# Patient Record
Sex: FEMALE | URBAN - METROPOLITAN AREA
[De-identification: names, ages, dates, MRNs, and addresses within clinical notes are randomized per-mention and may not be internally consistent; named-entity substitution may affect disease eponyms.]

---

## 2023-11-16 ENCOUNTER — OFFICE VISIT (OUTPATIENT)
Dept: URBAN - METROPOLITAN AREA CLINIC 63 | Facility: CLINIC | Age: 43
End: 2023-11-16
Payer: COMMERCIAL

## 2023-11-16 ENCOUNTER — LAB OUTSIDE AN ENCOUNTER (OUTPATIENT)
Dept: URBAN - METROPOLITAN AREA CLINIC 63 | Facility: CLINIC | Age: 43
End: 2023-11-16

## 2023-11-16 VITALS
DIASTOLIC BLOOD PRESSURE: 80 MMHG | HEIGHT: 62 IN | TEMPERATURE: 97.1 F | WEIGHT: 190 LBS | HEART RATE: 87 BPM | BODY MASS INDEX: 34.96 KG/M2 | OXYGEN SATURATION: 98 % | SYSTOLIC BLOOD PRESSURE: 128 MMHG

## 2023-11-16 DIAGNOSIS — R19.7 DIARRHEA, UNSPECIFIED TYPE: ICD-10-CM

## 2023-11-16 DIAGNOSIS — K21.9 GASTROESOPHAGEAL REFLUX DISEASE, UNSPECIFIED WHETHER ESOPHAGITIS PRESENT: ICD-10-CM

## 2023-11-16 DIAGNOSIS — R74.8 ABNORMAL LIVER ENZYMES: ICD-10-CM

## 2023-11-16 PROBLEM — 235595009: Status: ACTIVE | Noted: 2023-11-16

## 2023-11-16 PROCEDURE — 99204 OFFICE O/P NEW MOD 45 MIN: CPT | Performed by: INTERNAL MEDICINE

## 2023-11-16 RX ORDER — TRAMADOL HYDROCHLORIDE 50 MG/1
TAKE ONE TABLET BY MOUTH TWICE A DAY AS NEEDED FOR PAIN TABLET, COATED ORAL
Qty: 60 UNSPECIFIED | Refills: 0 | Status: ACTIVE | COMMUNITY

## 2023-11-16 RX ORDER — BUPROPION HYDROCHLORIDE 300 MG/1
TAKE ONE TABLET BY MOUTH EVERY MORNING TABLET, EXTENDED RELEASE ORAL
Qty: 30 UNSPECIFIED | Refills: 1 | Status: ACTIVE | COMMUNITY

## 2023-11-16 RX ORDER — OMEPRAZOLE 20 MG/1
1 CAPSULE 30 MINUTES BEFORE MORNING MEAL CAPSULE, DELAYED RELEASE ORAL ONCE A DAY
Status: ACTIVE | COMMUNITY

## 2023-11-16 RX ORDER — TACROLIMUS 0.75 MG/1
TAKE ONE BY MOUTH ONE TIME DAILY TABLET, EXTENDED RELEASE ORAL
Qty: 90 UNSPECIFIED | Refills: 0 | Status: ACTIVE | COMMUNITY

## 2023-11-16 RX ORDER — METHOCARBAMOL TABLETS 750 MG/1
TAKE ONE TABLET BY MOUTH TWICE A DAY AS NEEDED TABLET, COATED ORAL
Qty: 180 UNSPECIFIED | Refills: 0 | Status: ACTIVE | COMMUNITY

## 2023-11-16 RX ORDER — TRAZODONE HYDROCHLORIDE 100 MG/1
TAKE ONE TABLET BY MOUTH AT BEDTIME TABLET ORAL
Qty: 30 UNSPECIFIED | Refills: 1 | Status: ACTIVE | COMMUNITY

## 2023-11-16 RX ORDER — ALPRAZOLAM 0.25 MG/1
TAKE ONE TABLET BY MOUTH ONE TIME DAILY AS NEEDED TABLET ORAL
Qty: 30 UNSPECIFIED | Refills: 2 | Status: ACTIVE | COMMUNITY

## 2023-11-16 NOTE — HPI-TODAY'S VISIT:
Jessica is a 43-year-old female that presents today as a new patient.  Referred for elevated liver enzymes.  Past medical history significant for renal disease, Alport syndrome, hyperlipidemia, hypertension.  Past surgical history significant for kidney transplant, hysterectomy, cholecystectomy, gastric sleeve. Prior labs reviewed and are summarized below.  Her main complaint in the office today is diarrhea and epigastric pain.  She reports having between 2 and 4 loose stools daily.  She has had epigastric discomfort over the past 3 months.  He tried omeprazole with minimal improvement.  Denies nausea or vomiting.  Denies unintentional weight loss.  Denies dysphagia.  With regards to her liver enzymes, she denies prior history of liver disease.  Denies alcohol use.  New medications or supplements. Labs 09/2023:Bilirubin 0.5, alkaline phosphatase 241, AST 62, , creatinine 1.01 BUN 8, albumin 4.5. Labs 01/2023:Bilirubin 0.4, alkaline phosphatase 115, AST 20, ALT 23 creatinine 1.19, BUN 8.

## 2023-12-10 ENCOUNTER — TELEPHONE ENCOUNTER (OUTPATIENT)
Dept: URBAN - METROPOLITAN AREA CLINIC 63 | Facility: CLINIC | Age: 43
End: 2023-12-10

## 2023-12-11 ENCOUNTER — TELEPHONE ENCOUNTER (OUTPATIENT)
Dept: URBAN - METROPOLITAN AREA CLINIC 63 | Facility: CLINIC | Age: 43
End: 2023-12-11

## 2023-12-11 LAB
% SATURATION: 32
A/G RATIO: 1.9
ACTIN (SMOOTH MUSCLE) ANTIBODY (IGG): <20
ALBUMIN: 4.4
ALKALINE PHOSPHATASE: 107
ALT (SGPT): 15
ANA SCREEN, IFA: NEGATIVE
AST (SGOT): 17
BILIRUBIN, TOTAL: 0.5
BUN/CREATININE RATIO: 6
BUN: 7
CALCIUM: 9.6
CARBON DIOXIDE, TOTAL: 27
CERULOPLASMIN: 32
CHLORIDE: 103
CREATININE: 1.2
EGFR: 58
FERRITIN: 517
GLOBULIN, TOTAL: 2.3
GLUCOSE: 81
HEP B CORE AB, IGM: (no result)
HEPATITIS A AB, TOTAL: (no result)
HEPATITIS B CORE AB TOTAL: (no result)
HEPATITIS B SURFACE ANTIGEN: (no result)
HEPATITIS C ANTIBODY: (no result)
IMMUNOGLOBULIN G, QN, SERUM: 945
IRON BINDING CAPACITY: 286
IRON, TOTAL: 92
MITOCHONDRIAL (M2) ANTIBODY: <=20
POTASSIUM: 4.5
PROTEIN, TOTAL: 6.7
SODIUM: 140

## 2023-12-19 LAB
A-1 ANTITRYPSIN MUTATION: (no result)
Lab: (no result)
REFERRING PHYSICIAN: (no result)

## 2024-01-30 ENCOUNTER — OFFICE VISIT (OUTPATIENT)
Dept: URBAN - METROPOLITAN AREA CLINIC 63 | Facility: CLINIC | Age: 44
End: 2024-01-30
Payer: COMMERCIAL

## 2024-01-30 ENCOUNTER — DASHBOARD ENCOUNTERS (OUTPATIENT)
Age: 44
End: 2024-01-30

## 2024-01-30 VITALS
TEMPERATURE: 96.9 F | RESPIRATION RATE: 20 BRPM | HEART RATE: 66 BPM | SYSTOLIC BLOOD PRESSURE: 119 MMHG | WEIGHT: 191 LBS | OXYGEN SATURATION: 98 % | DIASTOLIC BLOOD PRESSURE: 72 MMHG | HEIGHT: 62 IN | BODY MASS INDEX: 35.15 KG/M2

## 2024-01-30 DIAGNOSIS — K21.9 GASTROESOPHAGEAL REFLUX DISEASE, UNSPECIFIED WHETHER ESOPHAGITIS PRESENT: ICD-10-CM

## 2024-01-30 DIAGNOSIS — R74.8 ABNORMAL LIVER ENZYMES: ICD-10-CM

## 2024-01-30 DIAGNOSIS — R19.7 DIARRHEA, UNSPECIFIED TYPE: ICD-10-CM

## 2024-01-30 PROCEDURE — 99214 OFFICE O/P EST MOD 30 MIN: CPT | Performed by: PHYSICIAN ASSISTANT

## 2024-01-30 RX ORDER — ALPRAZOLAM 0.25 MG/1
TAKE ONE TABLET BY MOUTH ONE TIME DAILY AS NEEDED TABLET ORAL
Qty: 30 UNSPECIFIED | Refills: 2 | Status: ACTIVE | COMMUNITY

## 2024-01-30 RX ORDER — BUPROPION HYDROCHLORIDE 300 MG/1
TAKE ONE TABLET BY MOUTH EVERY MORNING TABLET, EXTENDED RELEASE ORAL
Qty: 30 UNSPECIFIED | Refills: 1 | Status: ACTIVE | COMMUNITY

## 2024-01-30 RX ORDER — TACROLIMUS 0.75 MG/1
TAKE ONE BY MOUTH ONE TIME DAILY TABLET, EXTENDED RELEASE ORAL
Qty: 90 UNSPECIFIED | Refills: 0 | Status: ACTIVE | COMMUNITY

## 2024-01-30 RX ORDER — METHOCARBAMOL TABLETS 750 MG/1
TAKE ONE TABLET BY MOUTH TWICE A DAY AS NEEDED TABLET, COATED ORAL
Qty: 180 UNSPECIFIED | Refills: 0 | Status: ACTIVE | COMMUNITY

## 2024-01-30 RX ORDER — TRAZODONE HYDROCHLORIDE 100 MG/1
TAKE ONE TABLET BY MOUTH AT BEDTIME TABLET ORAL
Qty: 30 UNSPECIFIED | Refills: 1 | Status: ACTIVE | COMMUNITY

## 2024-01-30 RX ORDER — TRAMADOL HYDROCHLORIDE 50 MG/1
TAKE ONE TABLET BY MOUTH TWICE A DAY AS NEEDED FOR PAIN TABLET, COATED ORAL
Qty: 60 UNSPECIFIED | Refills: 0 | Status: ACTIVE | COMMUNITY

## 2024-01-30 RX ORDER — OMEPRAZOLE 20 MG/1
1 CAPSULE 30 MINUTES BEFORE MORNING MEAL CAPSULE, DELAYED RELEASE ORAL ONCE A DAY
Status: ACTIVE | COMMUNITY

## 2024-01-30 NOTE — HPI-TODAY'S VISIT:
44-year-old female with ESRD, Alport syndrome, hyperlipidemia, hypertension, kidney transplant, hysterectomy, cholecystectomy, gastric sleeve was referred to the office in November 2023 for evaluation of elevated liver enzymes. At her initial office visit, she complained of diarrhea and epigastric pain.  She reported having between 2 and 4 loose stools daily.  She reported epigastric discomfort over 3 months.  She tried omeprazole with minimal improvement.  She denied any nausea, vomiting, unintentional weight loss, dysphagia.  She denied a history of liver disease in the past.  She does not drink alcohol.  She did not start any new medications and was not using any new supplements. She was sent for a full hepatic panel and her labs were done on December 6, 2023.  Her CMP was normal with the exception of creatinine 1.20.  AST 17, ALT 15, alkaline phosphatase 107, total bilirubin 0.5.  MARCEL, AMA, ASMA negative.  Ferritin was elevated at 517, iron saturation 32%, serum iron 92.  She was positive for 1 copy of the Z allele (MZ) on alpha 1 antitrypsin mutation analysis suggesting that she is a carrier.  Ceruloplasmin was normal.  Serologies for viral hepatitis A, B, and C were negative. Stool studies were ordered but results were not received.  Right upper quadrant ultrasound was ordered but results were not received.  She states she was never given specimen containers when she went to the lab so [facility] never did the stool studies. She is no longer having any abdominal pain. She states she has diarrhea twice a day. States this is improved and she feels that this is due to her medications. She has no significant GI complaints today.    Labs 9/2023:Bilirubin 0.5, alkaline phosphatase 241, AST 62, , creatinine 1.01, BUN 8, albumin 4.5. Labs 1/20/2023:Bilirubin 0.4, alkaline phosphatase 115, AST 20, ALT 23, creatinine 1.19, BUN 8.